# Patient Record
Sex: FEMALE | Race: WHITE
[De-identification: names, ages, dates, MRNs, and addresses within clinical notes are randomized per-mention and may not be internally consistent; named-entity substitution may affect disease eponyms.]

---

## 2020-10-21 ENCOUNTER — HOSPITAL ENCOUNTER (EMERGENCY)
Dept: HOSPITAL 63 - ER | Age: 30
Discharge: HOME | End: 2020-10-21
Payer: OTHER GOVERNMENT

## 2020-10-21 VITALS — WEIGHT: 139.11 LBS | BODY MASS INDEX: 21.83 KG/M2 | HEIGHT: 67 IN

## 2020-10-21 VITALS — DIASTOLIC BLOOD PRESSURE: 72 MMHG | SYSTOLIC BLOOD PRESSURE: 153 MMHG

## 2020-10-21 DIAGNOSIS — E03.9: ICD-10-CM

## 2020-10-21 DIAGNOSIS — F31.9: ICD-10-CM

## 2020-10-21 DIAGNOSIS — F41.9: ICD-10-CM

## 2020-10-21 DIAGNOSIS — R07.89: Primary | ICD-10-CM

## 2020-10-21 LAB
ALBUMIN SERPL-MCNC: 4 G/DL (ref 3.4–5)
ALBUMIN/GLOB SERPL: 1.2 {RATIO} (ref 1–1.7)
ALP SERPL-CCNC: 61 U/L (ref 46–116)
ALT SERPL-CCNC: 24 U/L (ref 14–59)
ANION GAP SERPL CALC-SCNC: 10 MMOL/L (ref 6–14)
APTT PPP: YELLOW S
AST SERPL-CCNC: 20 U/L (ref 15–37)
BACTERIA #/AREA URNS HPF: 0 /HPF
BASOPHILS # BLD AUTO: 0 X10^3/UL (ref 0–0.2)
BASOPHILS NFR BLD: 1 % (ref 0–3)
BILIRUB SERPL-MCNC: 0.4 MG/DL (ref 0.2–1)
BILIRUB UR QL STRIP: (no result)
BUN/CREAT SERPL: 18 (ref 6–20)
CA-I SERPL ISE-MCNC: 18 MG/DL (ref 7–20)
CALCIUM SERPL-MCNC: 9.8 MG/DL (ref 8.5–10.1)
CHLORIDE SERPL-SCNC: 103 MMOL/L (ref 98–107)
CO2 SERPL-SCNC: 26 MMOL/L (ref 21–32)
CREAT SERPL-MCNC: 1 MG/DL (ref 0.6–1)
EOSINOPHIL NFR BLD: 0.1 X10^3/UL (ref 0–0.7)
EOSINOPHIL NFR BLD: 1 % (ref 0–3)
ERYTHROCYTE [DISTWIDTH] IN BLOOD BY AUTOMATED COUNT: 12.9 % (ref 11.5–14.5)
FIBRINOGEN PPP-MCNC: CLEAR MG/DL
GFR SERPLBLD BASED ON 1.73 SQ M-ARVRAT: 65.6 ML/MIN
GLOBULIN SER-MCNC: 3.4 G/DL (ref 2.2–3.8)
GLUCOSE SERPL-MCNC: 97 MG/DL (ref 70–99)
GLUCOSE UR STRIP-MCNC: (no result) MG/DL
HCT VFR BLD CALC: 42.6 % (ref 36–47)
HGB BLD-MCNC: 14.3 G/DL (ref 12–15.5)
LIPASE: 184 U/L (ref 73–393)
LYMPHOCYTES # BLD: 2.1 X10^3/UL (ref 1–4.8)
LYMPHOCYTES NFR BLD AUTO: 30 % (ref 24–48)
MCH RBC QN AUTO: 30 PG (ref 25–35)
MCHC RBC AUTO-ENTMCNC: 34 G/DL (ref 31–37)
MCV RBC AUTO: 90 FL (ref 79–100)
MONO #: 0.4 X10^3/UL (ref 0–1.1)
MONOCYTES NFR BLD: 6 % (ref 0–9)
NEUT #: 4.5 X10^3UL (ref 1.8–7.7)
NEUTROPHILS NFR BLD AUTO: 63 % (ref 31–73)
NITRITE UR QL STRIP: (no result)
PLATELET # BLD AUTO: 257 X10^3/UL (ref 140–400)
POTASSIUM SERPL-SCNC: 3.7 MMOL/L (ref 3.5–5.1)
PROT SERPL-MCNC: 7.4 G/DL (ref 6.4–8.2)
RBC # BLD AUTO: 4.76 X10^6/UL (ref 3.5–5.4)
RBC #/AREA URNS HPF: (no result) /HPF (ref 0–2)
SODIUM SERPL-SCNC: 139 MMOL/L (ref 136–145)
SP GR UR STRIP: 1.01
SQUAMOUS #/AREA URNS LPF: (no result) /LPF
UROBILINOGEN UR-MCNC: 0.2 MG/DL
WBC # BLD AUTO: 7.1 X10^3/UL (ref 4–11)
WBC #/AREA URNS HPF: (no result) /HPF (ref 0–4)

## 2020-10-21 PROCEDURE — 85025 COMPLETE CBC W/AUTO DIFF WBC: CPT

## 2020-10-21 PROCEDURE — 96360 HYDRATION IV INFUSION INIT: CPT

## 2020-10-21 PROCEDURE — 99285 EMERGENCY DEPT VISIT HI MDM: CPT

## 2020-10-21 PROCEDURE — 81001 URINALYSIS AUTO W/SCOPE: CPT

## 2020-10-21 PROCEDURE — 80053 COMPREHEN METABOLIC PANEL: CPT

## 2020-10-21 PROCEDURE — 36415 COLL VENOUS BLD VENIPUNCTURE: CPT

## 2020-10-21 PROCEDURE — 71045 X-RAY EXAM CHEST 1 VIEW: CPT

## 2020-10-21 PROCEDURE — 83690 ASSAY OF LIPASE: CPT

## 2020-10-21 PROCEDURE — 81025 URINE PREGNANCY TEST: CPT

## 2020-10-21 PROCEDURE — 84484 ASSAY OF TROPONIN QUANT: CPT

## 2020-10-21 PROCEDURE — 93005 ELECTROCARDIOGRAM TRACING: CPT

## 2020-10-21 PROCEDURE — 85379 FIBRIN DEGRADATION QUANT: CPT

## 2020-10-21 NOTE — EKG
[facility] [address], [place] [address]

Test Date:    2020-10-21               Test Time:    11:24:51

Pat Name:     TALISHA MONTOYA           Department:   

Patient ID:   [id number]           Room:          

Gender:       F                        Technician:   ALONDRA

:          1990               Requested By: JOSÉ BERMUDEZ

Order Number: [id number]            Reading MD:     

                                 Measurements

Intervals                              Axis          

Rate:         74                       P:            0

LA:           154                      QRS:          77

QRSD:         104                      T:            16

QT:           400                                    

QTc:          444                                    

                           Interpretive Statements

SINUS RHYTHM

NORMAL ECG

RI6.02

No previous ECG available for comparison

## 2020-10-21 NOTE — RAD
EXAMINATION: PORTABLE CHEST 1V

 

CLINICAL HISTORY: Chest pain

 

EXAM DATE/TIME: 10/21/2020 11:18 AM

 

COMPARISON: None

 

 

FINDINGS: 

 

Lines, Tubes, and Devices: None.

 

Cardiomediastinal Silhouette: Within normal limits.

 

Lungs and Pleura: No evidence of focal airspace consolidation or pleural 

effusion. Pulmonary vasculature unremarkable.

 

Bones and Soft Tissues: No acute osseous abnormality.

 

 

IMPRESSION:

 

No evidence of acute cardiopulmonary abnormality.

 

Electronically signed by: Nate Cardona DO (10/21/2020 11:47 AM) [id number]

## 2020-10-21 NOTE — PHYS DOC
Past History


Past Medical History:  Anxiety, Bipolar, Hyperthyroid, Other


Additional Past Medical Histor:  Lupos, hypogliciema


Past Surgical History:  Cholecystectomy


Smoking:  Non-smoker


Alcohol Use:  None





General Adult


EDM:


Chief Complaint:  CHEST PAIN





HPI:


HPI:





Patient is a 30 year old female who presents for evaluation of mid and lower 

chest discomfort.  Onset of symptoms over the past 2 to 3 days.  Furthermore she

has been feeling "weak and shaky".  She states she has some sweats at home but 

no cough or congestion.  Symptoms been progressing for the past 24 hours.  

Patient has a history of lupus, anxiety and bipolar disorder.  Patient denies 

any known cardiac history.  Patient is not currently short of breath.  Patient 

moderately anxious on arrival.  Vital signs are otherwise stable.  Patient has 

no known exposure to Covid.  She has had a low-grade recent elevated temp





Review of Systems:


Review of Systems:


Constitutional:  low grade fever or chills 


Eyes:  Denies change in visual acuity 


HENT:  Denies nasal congestion or sore throat 


Respiratory:  Denies cough has mild shortness of breath 


Cardiovascular:  lower central chest pain or edema 


GI:  Denies abdominal pain, nausea, vomiting, bloody stools or diarrhea 


: Denies dysuria 


Musculoskeletal:  Denies back pain or joint pain 


Integument:  Denies rash 


Neurologic:  Denies headache, focal weakness or sensory changes 


Endocrine:  Denies polyuria or polydipsia 


Lymphatic:  Denies swollen glands 


Psychiatric:  Denies depression has anxiety





Current Medications:


Current Meds:





Current Medications








 Medications


  (Trade)  Dose


 Ordered  Sig/Colton  Start Time


 Stop Time Status Last Admin


Dose Admin


 


 Sodium Chloride  1,000 ml @ 


 100 mls/hr  Q10H  10/21/20 11:18


 10/21/20 21:17  10/21/20 11:30


100 MLS/HR











Allergies:


Allergies:





Allergies








Coded Allergies Type Severity Reaction Last Updated Verified


 


  No Known Drug Allergies    10/21/20 No











Physical Exam:


PE:





Constitutional: Well developed, well nourished, mild acute distress, non-toxic 

appearance. []


HENT: Normocephalic, atraumatic, bilateral external ears normal, oropharynx 

moist, no oral exudates, nose normal. []


Eyes: PERRL, EOMI, conjunctiva normal, no discharge. [] 


Neck: Normal range of motion, no tenderness, supple, no stridor. [] 


Cardiovascular:Heart rate regular rhythm, no murmur []


Lungs & Thorax:  Bilateral breath sounds clear to auscultation, no rhonchi or 

rales, no wheezing []


Abdomen: Bowel sounds normal, soft, minimal epigastric tenderness, no masses, no

 pulsatile masses. [] 


Skin: Warm, dry, no erythema, no rash. [] 


Back: No tenderness, no CVA tenderness. [] 


Extremities: No tenderness, no cyanosis, ROM intact, no edema. [] 


Neurologic: Alert and oriented X 3, normal motor function, normal sensory 

function, no focal deficits noted. []


Psychologic: Affect abnormal, judgement normal, mood abnormal. []





Current Patient Data:


Labs:





Laboratory Tests








Test


 10/21/20


11:26 10/21/20


12:34


 


White Blood Count 7.1 x10^3/uL  


 


Red Blood Count 4.76 x10^6/uL  


 


Hemoglobin 14.3 g/dL  


 


Hematocrit 42.6 %  


 


Mean Corpuscular Volume 90 fL  


 


Mean Corpuscular Hemoglobin 30 pg  


 


Mean Corpuscular Hemoglobin


Concent 34 g/dL 


 





 


Red Cell Distribution Width 12.9 %  


 


Platelet Count 257 x10^3/uL  


 


Neutrophils (%) (Auto) 63 %  


 


Lymphocytes (%) (Auto) 30 %  


 


Monocytes (%) (Auto) 6 %  


 


Eosinophils (%) (Auto) 1 %  


 


Basophils (%) (Auto) 1 %  


 


Neutrophils # (Auto) 4.5 x10^3uL  


 


Lymphocytes # (Auto) 2.1 x10^3/uL  


 


Monocytes # (Auto) 0.4 x10^3/uL  


 


Eosinophils # (Auto) 0.1 x10^3/uL  


 


Basophils # (Auto) 0.0 x10^3/uL  


 


D-Dimer (Linda) 0.19 mg/L  


 


Sodium Level 139 mmol/L  


 


Potassium Level 3.7 mmol/L  


 


Chloride Level 103 mmol/L  


 


Carbon Dioxide Level 26 mmol/L  


 


Anion Gap 10  


 


Blood Urea Nitrogen 18 mg/dL  


 


Creatinine 1.0 mg/dL  


 


Estimated GFR


(Cockcroft-Gault) 65.6 


 





 


BUN/Creatinine Ratio 18  


 


Glucose Level 97 mg/dL  


 


Calcium Level 9.8 mg/dL  


 


Total Bilirubin 0.4 mg/dL  


 


Aspartate Amino Transf


(AST/SGOT) 20 U/L 


 





 


Alanine Aminotransferase


(ALT/SGPT) 24 U/L 


 





 


Alkaline Phosphatase 61 U/L  


 


Troponin I Quantitative < 0.017 ng/mL  


 


Total Protein 7.4 g/dL  


 


Albumin 4.0 g/dL  


 


Albumin/Globulin Ratio 1.2  


 


Lipase 184 U/L  


 


Bedside Urine HCG, Qualitative  hcg negative 








Current Medications








 Medications


  (Trade)  Dose


 Ordered  Sig/Colton


 Route


 PRN Reason  Start Time


 Stop Time Status Last Admin


Dose Admin


 


 Sodium Chloride  1,000 ml @ 


 100 mls/hr  Q10H


 IV


   10/21/20 11:18


 10/21/20 21:17  10/21/20 11:30











Vital Signs:





                                   Vital Signs








  Date Time  Temp Pulse Resp B/P (MAP) Pulse Ox O2 Delivery O2 Flow Rate FiO2


 


10/21/20 11:17 97.8 90 14 136/52 (80 98 Room Air  











EKG:


EKG:


EKG shows normal sinus rhythm, rate 74, inverted T waves lead III, otherwise 

unremarkable EKG, not STEMI []





Radiology/Procedures:


Radiology/Procedures:


[[facility] [address], [place] [address]


                                 [phone]


                                        


                                 IMAGING REPORT





                                     Signed





PATIENT: TALISHA MONTOYA ACCOUNT: [id number]     MRN#: [id number]


: 1990           LOCATION: ER              AGE: 30


SEX: F                    EXAM DT: 10/21/20         ACCESSION#: [id number]


STATUS: REG ER            ORD. PHYSICIAN: JOSÉ BERMUDEZ DO


REASON: chest pain


PROCEDURE: PORTABLE CHEST 1V





EXAMINATION: PORTABLE CHEST 1V


 


CLINICAL HISTORY: Chest pain


 


EXAM DATE/TIME: 10/21/2020 11:18 AM


 


COMPARISON: None


 


 


FINDINGS: 


 


Lines, Tubes, and Devices: None.


 


Cardiomediastinal Silhouette: Within normal limits.


 


Lungs and Pleura: No evidence of focal airspace consolidation or pleural 


effusion. Pulmonary vasculature unremarkable.


 


Bones and Soft Tissues: No acute osseous abnormality.


 


 


IMPRESSION:


 


No evidence of acute cardiopulmonary abnormality.


 


Electronically signed by: Nate Cardona DO (10/21/2020 11:47 AM) [id number]














DICTATED AND SIGNED BY:     NATE CARDONA DO


DATE:     10/21/20 1147





CC: PCP,LACY; JOSÉ BERMUDEZ DO ~


]





Heart Score:


HEART Score for Chest Pain:  








HEART Score for Chest Pain Response (Comments) Value


 


History Slighlty/Non-Suspicious 0


 


ECG Normal 0


 


Age < 45 0


 


Risk Factors No Risk Factors 0


 


Troponin < Normal Limit 0


 


Total  0








Risk Factors:


Risk Factors:  DM, Current or recent (<one month) smoker, HTN, HLP, family hi

story of CAD, obesity.


Risk Scores:


Score 0 - 3:  2.5% MACE over next 6 weeks - Discharge Home


Score 4 - 6:  20.3% MACE over next 6 weeks - Admit for Clinical Observation


Score 7 - 10:  72.7% MACE over next 6 weeks - Early Invasive Strategies





Course & Med Decision Making:


Course & Med Decision Making


Pertinent Labs and Imaging studies reviewed. (See chart for details)





[]





Dragon Disclaimer:


Dragon Disclaimer:


This electronic medical record was generated, in whole or in part, using a voice

 recognition dictation system.





1240 stable, essentially asymptomatic at this time.  Chest x-ray was clear and 

her cardiac work-up was normal.  Pulmonary embolism was considered but patient 

has a normal D-dimer.  Patient able for close follow-up.  I suspect patient has 

either gastritis or a developing bronchitis.  We discussed the possibility of 

Covid but patient has no known exposure.  She was not tested at this time and 

she does not have a cough or other more classic symptoms of that illness.  

Pancreatitis was considered but patient had normal lipase





Departure


Departure:


Impression:  


   Primary Impression:  


   Chest pain


   Qualified Codes:  R07.9 - Chest pain, unspecified


   Additional Impression:  


   Anxiety


Disposition:  01 DC HOME SELF CARE/HOMELESS


Condition:  STABLE


Referrals:  


PCP,NO (PCP)


Patient Instructions:  Anxiety and Panic Attacks, Chest Pain (Nonspecific)





Additional Instructions:  


[place] diet, no spicy foods, do not eat late at night.  Take over-the-counter 

acid reducing medication for your stomach as directed.  Your heart work-up, EKG,

 chest x-ray and blood work were all unremarkable











JOSÉ BERMUDEZ DO              Oct 21, 2020 11:34